# Patient Record
(demographics unavailable — no encounter records)

---

## 2024-10-11 NOTE — PHYSICAL EXAM
[de-identified] : 1 cm firm right thyroid nodule, well circumscribed and mobile [Laryngoscopy Performed] : laryngoscopy was performed, see procedure section for findings [Midline] : located in midline position [Normal] : orientation to person, place, and time: normal [de-identified] : indirect  laryngoscopy shows normal vocal cord mobility bilaterally with no lesions noted

## 2024-10-11 NOTE — HISTORY OF PRESENT ILLNESS
[de-identified] : Pt c/o Thyroid nodule found on PET/CT for renal Cancer evaluation. denies hoarseness, dysphagia, SOB or RT exposure sonogram: Right 1.3 cm nodule, no adenopathy FNA ([facility]): PTC  normal TFTs, calcium 9.2 I have reviewed all old and new data and available images.  Additional information was obtained from others present at the time of visit to ensure the completeness of the history

## 2024-10-11 NOTE — CONSULT LETTER
[Dear  ___] : Dear  [unfilled], [Consult Letter:] : I had the pleasure of evaluating your patient, [unfilled]. [Please see my note below.] : Please see my note below. [Consult Closing:] : Thank you very much for allowing me to participate in the care of this patient.  If you have any questions, please do not hesitate to contact me. [Sincerely,] : Sincerely, [FreeTextEntry2] : Dr. Maxwell Lobato, Dr. Stephon Sosa [FreeTextEntry3] : Kei Fox MD, FACS System Director, Endocrine Surgery [facility] Associate  Professor of Surgery [employer] [facility] [employer] at [facility] [DrAntonio  ___] : Dr. CRUZ

## 2024-10-11 NOTE — PHYSICAL EXAM
[de-identified] : 1 cm firm right thyroid nodule, well circumscribed and mobile [Laryngoscopy Performed] : laryngoscopy was performed, see procedure section for findings [Midline] : located in midline position [Normal] : orientation to person, place, and time: normal [de-identified] : indirect  laryngoscopy shows normal vocal cord mobility bilaterally with no lesions noted

## 2024-10-11 NOTE — ASSESSMENT
[FreeTextEntry1] : lengthy discussion regarding options for management including active surveillance  vs thyroid lobectomy with possible paratracheal node dissection, with or without frozen section vs total thyroidectomy with possible paratracheal node dissection.  risks, benefits and alternatives discussed at length.  I have discussed with the patient the anatomy of the area, the pathophysiology of the disease process and the rationale for surgery.  The attendant risks, possible complications and expected postoperative course have been discussed in detail.  I have given the patient the opportunity to ask questions, and all questions have been answered to the patient's satisfaction, and they wish to proceed with the planned procedure.  to be scheduled for lobectomy, possible total thyroidectomy with recurrent nerve monitoring, inpatient at [facility].  MRI requested to evaluate adjacent soft tissue. to call next week for results.

## 2024-10-11 NOTE — HISTORY OF PRESENT ILLNESS
[de-identified] : Pt c/o Thyroid nodule found on PET/CT for renal Cancer evaluation. denies hoarseness, dysphagia, SOB or RT exposure sonogram: Right 1.3 cm nodule, no adenopathy FNA ([facility]): PTC  normal TFTs, calcium 9.2 I have reviewed all old and new data and available images.  Additional information was obtained from others present at the time of visit to ensure the completeness of the history

## 2024-10-11 NOTE — ASSESSMENT
[FreeTextEntry1] : lengthy discussion regarding options for management including active surveillance  vs thyroid lobectomy with possible paratracheal node dissection, with or without frozen section vs total thyroidectomy with possible paratracheal node dissection.  risks, benefits and alternatives discussed at length.  I have discussed with the patient the anatomy of the area, the pathophysiology of the disease process and the rationale for surgery.  The attendant risks, possible complications and expected postoperative course have been discussed in detail.  I have given the patient the opportunity to ask questions, and all questions have been answered to the patient's satisfaction, and they wish to proceed with the planned procedure.  to be scheduled for lobectomy, possible total thyroidectomy with recurrent nerve monitoring, inpatient at [facility].  MRI requested to evaluate adjacent soft tissue. to call next week for results.

## 2024-11-20 NOTE — CONSULT LETTER
[Dear  ___] : Dear  [unfilled], [FreeTextEntry1] : I had the pleasure of seeing MR. MADI PINK today for a preop medical clearance evaluation.\par  \par  Patient is medically stable and cleared for the proposed surgery.\par  Please see my note below for details.\par  If you should have any questions, please do not hesitate to contact me.\par  \par  Sincerely,\par  \par  Balaji Su NP

## 2024-11-20 NOTE — HISTORY OF PRESENT ILLNESS
[No Pertinent Cardiac History] : no history of aortic stenosis, atrial fibrillation, coronary artery disease, recent myocardial infarction, or implantable device/pacemaker [No Pertinent Pulmonary History] : no history of asthma, COPD, sleep apnea, or smoking [No Adverse Anesthesia Reaction] : no adverse anesthesia reaction in self or family member [(Patient denies any chest pain, claudication, dyspnea on exertion, orthopnea, palpitations or syncope)] : Patient denies any chest pain, claudication, dyspnea on exertion, orthopnea, palpitations or syncope [Poor (<4 METs)] : Poor (<4 METs) [Chronic Anticoagulation] : no chronic anticoagulation [Chronic Kidney Disease] : no chronic kidney disease [Diabetes] : no diabetes [FreeTextEntry1] : THYROID LOBECTOMY  [FreeTextEntry2] : 12/04/2024 [FreeTextEntry3] : DR. MORENO

## 2024-12-17 NOTE — PHYSICAL EXAM
[de-identified] : well healed scar [Midline] : located in midline position [Normal] : orientation to person, place, and time: normal

## 2024-12-17 NOTE — ASSESSMENT
[FreeTextEntry1] : s/p Thyroid lobectomy PTC path reviewed with DR Fox and discussed with patient and spouse no further intervention needed at this time Pt to review with Dr Love f/u 6 weeks f/u DR Love as scheduled

## 2025-01-30 NOTE — PHYSICAL EXAM
[de-identified] : well healed scar [Midline] : located in midline position [Normal] : orientation to person, place, and time: normal

## 2025-01-30 NOTE — ASSESSMENT
[FreeTextEntry1] : s/p Thyroid lobectomy PTC Labs in 2 months continue synthroid f/u sono and visit 4 months

## 2025-01-30 NOTE — HISTORY OF PRESENT ILLNESS
[de-identified] : Pt 2 months s/p Thyroid lobectomy doing well without complaints DR Love started on synthroid 25 mcg Pt wishes to follow up here

## 2025-05-27 NOTE — PHYSICAL EXAM
[de-identified] : well healed scar [Midline] : located in midline position [Normal] : orientation to person, place, and time: normal

## 2025-05-27 NOTE — ASSESSMENT
[FreeTextEntry1] : s/p Thyroid lobectomy labs and sono reviewed and discussed sono 3-6 months continue synthroid f/u 6 months

## 2025-05-27 NOTE — HISTORY OF PRESENT ILLNESS
[de-identified] : Pt 6 months s/p thyroid lobectomy for malignancy recent labs reviewed and patient feeling better recent sono shows prominent lymph node level 3 Pt had URI at the same time